# Patient Record
Sex: FEMALE | Race: BLACK OR AFRICAN AMERICAN | NOT HISPANIC OR LATINO | Employment: FULL TIME | URBAN - METROPOLITAN AREA
[De-identification: names, ages, dates, MRNs, and addresses within clinical notes are randomized per-mention and may not be internally consistent; named-entity substitution may affect disease eponyms.]

---

## 2018-01-04 PROBLEM — Z14.1 CYSTIC FIBROSIS CARRIER: Status: ACTIVE | Noted: 2018-01-04

## 2020-06-10 DIAGNOSIS — R20.2 PARESTHESIA OF SKIN: Primary | ICD-10-CM

## 2020-06-12 ENCOUNTER — HOSPITAL ENCOUNTER (OUTPATIENT)
Dept: RADIOLOGY | Facility: HOSPITAL | Age: 30
Discharge: HOME | End: 2020-06-12
Attending: INTERNAL MEDICINE
Payer: MEDICAID

## 2020-06-12 DIAGNOSIS — R20.2 PARESTHESIA OF SKIN: ICD-10-CM

## 2020-06-12 PROCEDURE — 72100 X-RAY EXAM L-S SPINE 2/3 VWS: CPT | Mod: 26,,, | Performed by: RADIOLOGY

## 2020-06-12 PROCEDURE — 72100 XR LUMBAR SPINE 2 OR 3 VIEWS: ICD-10-PCS | Mod: 26,,, | Performed by: RADIOLOGY

## 2020-06-12 PROCEDURE — 72100 X-RAY EXAM L-S SPINE 2/3 VWS: CPT | Mod: TC,FY

## 2023-05-09 DIAGNOSIS — M25.561 RIGHT KNEE PAIN: Primary | ICD-10-CM

## 2023-07-14 ENCOUNTER — CLINICAL SUPPORT (OUTPATIENT)
Dept: REHABILITATION | Facility: HOSPITAL | Age: 33
End: 2023-07-14
Payer: MEDICAID

## 2023-07-14 DIAGNOSIS — M25.661 DECREASED RANGE OF MOTION OF RIGHT KNEE: ICD-10-CM

## 2023-07-14 DIAGNOSIS — M62.81 QUADRICEPS WEAKNESS: ICD-10-CM

## 2023-07-14 PROCEDURE — 97110 THERAPEUTIC EXERCISES: CPT | Mod: PN

## 2023-07-14 PROCEDURE — 97161 PT EVAL LOW COMPLEX 20 MIN: CPT | Mod: PN

## 2023-07-14 NOTE — PROGRESS NOTES
[facility]  Physical Therapy Initial Evaluation    Name: Althea Lopez  Clinic Number: [id number]    Therapy Diagnosis:   Encounter Diagnoses   Name Primary?    Decreased range of motion of right knee     Quadriceps weakness      Physician: Order, Paper    Physician Orders: PT Eval and Treat   Medical Diagnosis from Referral:   M25.561 (ICD-10-CM) - Right knee pain    Evaluation Date: 7/14/2023  Plan of Care Expiration: 10/14/23    Authorization Period Expiration: 9/1/23  Visit # / Visits authorized: 1/ 1      Time In: 0815  Time Out: 0910    Total Billable Time: 55 minutes    Precautions: Standard    Subjective   Date of onset: 6 months    History of current condition:   Althea  is a 32 year old female presenting with c/o bilateral knee pain, right > left.  She reports an insidious onset 6 months ago that is worsening.  She denies any recent injections.  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  She states he pain is worse with cold breeze.   The patient denies a history of falls or use of an assistive device.  Her goal is for the knees to get better. She would like to improve the functional mobility of the knees. She would like to get on floor and play with one year old child.      Medical History:   No past medical history on file.    Surgical History:   Althea Lopez  has a past surgical history that includes Dilation and curettage of uterus.    Medications:   Althea has a current medication list which includes the following prescription(s): prenatal vit103-iron fum-folic.    Allergies:   Review of patient's allergies indicates:  No Known Allergies     Imaging: none on record.     Prior Therapy: none  Social History:  unknown, [facility]  Occupation: CNA  Prior Level of Function: independent  Current Level of Function: independent    Pain:  Current 6/10, worst 10/10, best 6/10   Location: Bilateral knees, right > left    Aggravating Factors: bending knee,  kneeling, squatting, stairs, twisting  Easing Factors: medicine     Pts goals: Her goal is for the knees to get better. She would like to improve the functional mobility of the knees. She would like to get on floor and play with one year old child.      Objective     Gait: pt presents ambulating with an antalgic gait. Decreased stance on right LE.   Observation: mild to moderate quad atrophy  Palpation: mild tenderness medial and lateral joint line.   Sensation: Intact    Range of Motion/Strength:   .  Knee  Right   Left      AROM  MMT  AROM  MMT    Flexion  100 4- 105 4-   Extension  -2 4- -2 4-       Bed Mobility:Independent   Transfers: Independent         TREATMENT     Treatment Time In: 0845  Treatment Time Out: 0915    Total Treatment time separate from Evaluation: 30 minutes    Althea received therapeutic exercises to develop strength, endurance, ROM, flexibility, posture and core stabilization for 20 minutes including:     quad set 2 x 10  SLR 2 x 10  Ball squeeze 2 x 10  Heel slide x 10  Towel stretch 30 sec x 3 ea.     Althea received the following manual therapy techniques:  were applied to the: bilateral knees for 10 minutes, including:  Patellar mobilization, bilateral tibiofemoral distraction grade 2    Education provided:   - HEP compliance    Written Home Exercises Provided: yes.    Exercises were reviewed and Althea was able to demonstrate them prior to the end of the session.  Althea demonstrated good  understanding of the education provided.     See EMR under Patient Instructions for exercises provided 7/14/2023.    Assessment     Pt presents with signs and symptoms consistent with referring diagnosis. Evaluation has determined a decrease in functional status and subjective and objective deficits that can be addressed by physical therapy intervention. Pt demonstrates pain limiting functional activities. Decreased flexibility and strength limiting normal movement patterns. Decreased segmental  motion. Decreased postural strength and awareness. Positive special testing. Decreased participation in functional and recreational activities. Subjective and objective measures are addressed by goals in the plan of care.  Patient/family are involved in the development of these goals. Patient/family are educated about current injury and treatment.       Plan of care was dicussed with patient. Pt will benefit from skilled outpatient Physical Therapy to address the deficits stated above and in the chart below, provide pt/family education, and to maximize pt's level of independence. Pt's spiritual, cultural and educational needs considered and patient is agreeable to the plan of care and goals as stated below:     Pt prognosis is Good.  Anticipated Barriers for therapy: none    Medical Necessity is demonstrated by the following  History  Co-morbidities and personal factors that may impact the plan of care Co-morbidities:   none    Personal Factors:   no deficits     low   Examination  Body Structures and Functions, activity limitations and participation restrictions that may impact the plan of care Body Regions:   lower extremities    Body Systems:    gross symmetry  ROM  strength  gait  transitions    Participation Restrictions:   Work, housework,     Activity limitations:   Learning and applying knowledge  no deficits    General Tasks and Commands  no deficits    Communication  no deficits    Mobility  lifting and carrying objects  walking    Self care  dressing    Domestic Life  shopping  cooking  doing house work (cleaning house, washing dishes, laundry)    Interactions/Relationships  no deficits    Life Areas  no deficits    Community and Social Life  community life  recreation and leisure         low   Clinical Presentation stable and uncomplicated low   Decision Making/ Complexity Score: low     Goals:    Short Term Goals (4 Weeks):     1.Pt to increase strength by a 1/2 grade of muscles test to allow for  improvement in functional activities such as performing chores.  2.Pt to improve range of motion by 25% to allow for improved functional mobility to allow for improvement in IADLs.   3.Pt to report compliance with HEP and demonstrate proper exercise technique to PT to show competence with self management of condition.  4.Decrease pain by 25% during functional activities.    Long Term Goals (12 Weeks):     1. Increase ROM to allow improved joint biomechanics during functional activities.   2.Increase trunk and lower extremity strength to within normal limits during functional activities.   3. Independent with home exercise program.   4. Full return to functional activities with manageable complaints.  5. Patient to demonstrate improved posture and body mechanics.  6. Decrease pain by 75% during functional activities.    Plan     Plan of care Certification: 7/14/2023 to 10/14/23.    Recommended Treatment Plan: 2 times per week for 12 weeks with treatments to consist of:  Neuromuscular and postural re-education,  training, therapeutic exercise, therapeutic activities,balance training, gait training, manual therapy, soft tissue mobilization, ROM exercises, Cardiovascular,  Postural stabilization, manual traction, spinal mobilization, moist heat, cryotherapy, electrical stimulation, ultrasound, home exercise education and planning.    Elliot Messer, PT

## 2023-07-18 ENCOUNTER — CLINICAL SUPPORT (OUTPATIENT)
Dept: REHABILITATION | Facility: HOSPITAL | Age: 33
End: 2023-07-18
Payer: MEDICAID

## 2023-07-18 DIAGNOSIS — M62.81 QUADRICEPS WEAKNESS: ICD-10-CM

## 2023-07-18 DIAGNOSIS — M25.661 DECREASED RANGE OF MOTION OF RIGHT KNEE: Primary | ICD-10-CM

## 2023-07-18 PROCEDURE — 97110 THERAPEUTIC EXERCISES: CPT | Mod: PN,CQ

## 2023-07-18 NOTE — PROGRESS NOTES
"  Physical Therapy Daily Treatment Note     Name: Althea Lopez  Clinic Number: [id number]    Therapy Diagnosis:   Encounter Diagnoses   Name Primary?    Decreased range of motion of right knee Yes    Quadriceps weakness      Physician: Order, Paper    Visit Date: 7/18/2023    Physician Orders: PT Eval and Treat   Medical Diagnosis from Referral:   M25.561 (ICD-10-CM) - Right knee pain     Evaluation Date: 7/14/2023  Plan of Care Expiration: 10/14/23     Authorization Period Expiration: 9/1/23  Visit # / Visits authorized: 1/ 12  PN DUE: 08/14/2023     Time In: 0930AM  Time Out: 1030AM     Total Billable Time: 60 minutes     Precautions: Standard    Precautions: Standard    Subjective     Pt reports: she's been doing the exercises at home, and its not as sore and painful as before.  She was compliant with home exercise program.  Response to previous treatment: initial eval  Functional change: ongoing    Pain: 5/10  Location: Bilateral knees, right > left    Objective     Althea received therapeutic exercises to develop strength, endurance, ROM, flexibility, and posture for 60 minutes including:    Upright Bike 10 min  Shuttle Squats 1 black band 3x10  Lateral Step Ups 2x10 R only  Quad set 2 x 10, 3"/ea  SLR 2 x 10/ea  Ball squeeze 2 x 10, 3"  Heel slide 2 min/ea  Towel stretch 30 sec x 3 ea.-NP    Althea received the following manual therapy techniques: Joint mobilizations were applied to the: 00 for 00 minutes, including:  Patellar mobilization, bilateral tibiofemoral distraction grade 2      Althea received cold pack for 10 minutes to both knees.      Home Exercises Provided and Patient Education Provided     Education provided:   Cont to perform HEP as provided.     Written Home Exercises Provided: Patient instructed to cont prior HEP.  Exercises were reviewed and Althea was able to demonstrate them prior to the end of the session.  Althea demonstrated good  understanding of the education provided.     See EMR " under Patient Instructions for exercises provided prior visit.    Assessment     Althea tolerated the above tx session fair to well with minimal c/o pain. Initiated quad and glute strengthening in addition to reiterating HEP, and pt performed well with an appropriate level of muscle fatigue achieved throughout. Pt encouraged to continue HEP between sessions.    Althea Is progressing well towards her goals.   Pt prognosis is Good.     Pt will continue to benefit from skilled outpatient physical therapy to address the deficits listed in the problem list box on initial evaluation, provide pt/family education and to maximize pt's level of independence in the home and community environment.     Pt's spiritual, cultural and educational needs considered and pt agreeable to plan of care and goals.    Anticipated barriers to physical therapy: none    Goals: Short Term Goals (4 Weeks):      1.Pt to increase strength by a 1/2 grade of muscles test to allow for improvement in functional activities such as performing chores.  2.Pt to improve range of motion by 25% to allow for improved functional mobility to allow for improvement in IADLs.   3.Pt to report compliance with HEP and demonstrate proper exercise technique to PT to show competence with self management of condition.  4.Decrease pain by 25% during functional activities.     Long Term Goals (12 Weeks):      1. Increase ROM to allow improved joint biomechanics during functional activities.   2.Increase trunk and lower extremity strength to within normal limits during functional activities.   3. Independent with home exercise program.   4. Full return to functional activities with manageable complaints.  5. Patient to demonstrate improved posture and body mechanics.  6. Decrease pain by 75% during functional activities.    Plan     Certification date:     Cont skilled PT session towards PT and patient's goals.    Chelsi Romero, PTA   07/18/2023

## 2023-07-20 ENCOUNTER — CLINICAL SUPPORT (OUTPATIENT)
Dept: REHABILITATION | Facility: HOSPITAL | Age: 33
End: 2023-07-20
Payer: MEDICAID

## 2023-07-20 DIAGNOSIS — M25.661 DECREASED RANGE OF MOTION OF RIGHT KNEE: Primary | ICD-10-CM

## 2023-07-20 DIAGNOSIS — M62.81 QUADRICEPS WEAKNESS: ICD-10-CM

## 2023-07-20 PROCEDURE — 97110 THERAPEUTIC EXERCISES: CPT | Mod: PN

## 2023-07-20 NOTE — PROGRESS NOTES
"  Physical Therapy Daily Treatment Note     Name: Althea Lopez  Clinic Number: [id number]    Therapy Diagnosis:   Encounter Diagnoses   Name Primary?    Decreased range of motion of right knee Yes    Quadriceps weakness      Physician: Order, Paper    Visit Date: 7/20/2023    Physician Orders: PT Eval and Treat   Medical Diagnosis from Referral:   M25.561 (ICD-10-CM) - Right knee pain     Evaluation Date: 7/14/2023  Plan of Care Expiration: 10/14/23     Authorization Period Expiration: 9/1/23  Visit # / Visits authorized: 2/12 (+Evaluation)  PN DUE: 08/14/2023     Time In: 9:30 AM  Time Out: 10:40 AM     Total Billable Time: 60 minutes    Precautions: Standard    Subjective     Pt reports: Soreness from last session but overall feels better. Did a lot of driving yesterday that caused some more soreness. Continues to have more R knee pain than L knee pain.   She was compliant with home exercise program.  Response to previous treatment: No adverse reactions.  Functional change: Ongoing    Pain: 5/10  Location: Bilateral knees, right > left    Objective     Bold = performed    Althea received therapeutic exercises to develop strength, endurance, ROM, flexibility, and posture for 60 minutes including:    Upright Bike: 10 minutes  Quad sets: 2 x 10, 3"/ea  SLR 2 x 10/ea  Glute Bridge + Adductor Ball squeeze: 3 x 10, 3"  +LAQ's: 2x10, 3" holds, 3# ankle weights  +Lateral Monster Walks: 25'x2 laps, RTB at knees  Shuttle Squats 1 black band 3x10  Lateral Step Ups 2x10 R only  Heel slide 2 min/ea  Towel stretch 30 sec x 3 ea.-NP    Althea received the following manual therapy techniques: Joint mobilizations were applied to the: 00 for 00 minutes, including:  Patellar mobilization, bilateral tibiofemoral distraction grade 2      Althea received a hot pack for 10 minutes to both knees at the end of the session.      Home Exercises Provided and Patient Education Provided     Education provided:   Cont to perform HEP as provided. "     Written Home Exercises Provided: Patient instructed to cont prior HEP.  Exercises were reviewed and Althea was able to demonstrate them prior to the end of the session.  Althea demonstrated good  understanding of the education provided.     See EMR under Patient Instructions for exercises provided prior visit.    Assessment     Althea presented to therapy with reports of increased soreness in bilateral knees, R>L. Tolerated the above session well with continued focus on improving quadriceps, hamstring, and gluteal strength. Requires increased rest breaks between exercises due to fatigue and dizziness. Progressed to glue bridges and lateral monster walks with fair tolerance. Overall continues to benefit from participating in skilled physical therapy in order to progress towards functional goals.     Althea Is progressing well towards her goals.   Pt prognosis is Good.     Pt will continue to benefit from skilled outpatient physical therapy to address the deficits listed in the problem list box on initial evaluation, provide pt/family education and to maximize pt's level of independence in the home and community environment.     Pt's spiritual, cultural and educational needs considered and pt agreeable to plan of care and goals.    Anticipated barriers to physical therapy: none    Goals: Short Term Goals (4 Weeks):      1.Pt to increase strength by a 1/2 grade of muscles test to allow for improvement in functional activities such as performing chores.  2.Pt to improve range of motion by 25% to allow for improved functional mobility to allow for improvement in IADLs.   3.Pt to report compliance with HEP and demonstrate proper exercise technique to PT to show competence with self management of condition.  4.Decrease pain by 25% during functional activities.     Long Term Goals (12 Weeks):      1. Increase ROM to allow improved joint biomechanics during functional activities.   2.Increase trunk and lower extremity  strength to within normal limits during functional activities.   3. Independent with home exercise program.   4. Full return to functional activities with manageable complaints.  5. Patient to demonstrate improved posture and body mechanics.  6. Decrease pain by 75% during functional activities.    Plan     Certification date:     Cont skilled PT session towards PT and patient's goals.    Claudia Love, PT   07/20/2023

## 2023-07-26 ENCOUNTER — CLINICAL SUPPORT (OUTPATIENT)
Dept: REHABILITATION | Facility: HOSPITAL | Age: 33
End: 2023-07-26
Payer: MEDICAID

## 2023-07-26 DIAGNOSIS — M62.81 QUADRICEPS WEAKNESS: ICD-10-CM

## 2023-07-26 DIAGNOSIS — M25.661 DECREASED RANGE OF MOTION OF RIGHT KNEE: Primary | ICD-10-CM

## 2023-07-26 PROCEDURE — 97110 THERAPEUTIC EXERCISES: CPT | Mod: PN,CQ

## 2023-07-26 NOTE — PROGRESS NOTES
"  Physical Therapy Daily Treatment Note     Name: Althea Lopez  Clinic Number: [id number]    Therapy Diagnosis:   Encounter Diagnoses   Name Primary?    Decreased range of motion of right knee Yes    Quadriceps weakness      Physician: Order, Paper    Visit Date: 7/26/2023    Physician Orders: PT Eval and Treat   Medical Diagnosis from Referral:   M25.561 (ICD-10-CM) - Right knee pain     Evaluation Date: 7/14/2023  Plan of Care Expiration: 10/14/23     Authorization Period Expiration: 9/1/23  Visit # / Visits authorized: 3/12 (+Evaluation)  PN DUE: 08/14/2023     Time In: 0821AM  Time Out: 0915AM     Total Billable Time: 50 minutes    Precautions: Standard    Subjective     Pt reports: her left knee     She was compliant with home exercise program.  Response to previous treatment: No adverse reactions.  Functional change: Ongoing    Pain: 5/10  Location: Bilateral knees, right > left    Objective     Bold = performed    Althea received therapeutic exercises to develop strength, endurance, ROM, flexibility, and posture for 60 minutes including:    Upright Bike: 10 minutes  Quad sets: 2 x 10, 3"/ea  SLR 2 x 10/ea  Glute Bridge + Adductor Ball squeeze: 3 x 10, 3"  LAQ's: 2x10, 3" holds, 3# ankle weights  Lateral Monster Walks: 40'x6 laps, RTB at knees  Shuttle Squats 1.5 black band 3x10      Resume NV, if applicable:  Lateral Step Ups 2x10 R only  Heel slide 2 min/ea  Towel stretch 30 sec x 3 ea.    Althea received a cold pack for 10 minutes to both knees at the end of the session.      Home Exercises Provided and Patient Education Provided     Education provided:   Cont to perform HEP as provided.     Written Home Exercises Provided: Patient instructed to cont prior HEP.  Exercises were reviewed and Althea was able to demonstrate them prior to the end of the session.  Althea demonstrated good  understanding of the education provided.     See EMR under Patient Instructions for exercises provided prior " visit.    Assessment     Althea continues to reports with pain and stiffness in bilateral knees, R>L. Continues to tolerate the above session well with continued focus on improving quadriceps, hamstring, and gluteal strength. Pt also requires increased rest breaks between exercises due to fatigue. Overall continues to benefit from participating in skilled physical therapy in order to progress towards decreased pain and functional goals.    Althea Is progressing well towards her goals.   Pt prognosis is Good.     Pt will continue to benefit from skilled outpatient physical therapy to address the deficits listed in the problem list box on initial evaluation, provide pt/family education and to maximize pt's level of independence in the home and community environment.     Pt's spiritual, cultural and educational needs considered and pt agreeable to plan of care and goals.    Anticipated barriers to physical therapy: none    Goals: Short Term Goals (4 Weeks):      1.Pt to increase strength by a 1/2 grade of muscles test to allow for improvement in functional activities such as performing chores.  2.Pt to improve range of motion by 25% to allow for improved functional mobility to allow for improvement in IADLs.   3.Pt to report compliance with HEP and demonstrate proper exercise technique to PT to show competence with self management of condition.  4.Decrease pain by 25% during functional activities.     Long Term Goals (12 Weeks):      1. Increase ROM to allow improved joint biomechanics during functional activities.   2.Increase trunk and lower extremity strength to within normal limits during functional activities.   3. Independent with home exercise program.   4. Full return to functional activities with manageable complaints.  5. Patient to demonstrate improved posture and body mechanics.  6. Decrease pain by 75% during functional activities.    Plan     Certification date:     Cont skilled PT session towards PT and  patient's goals.    Chelsi Romero, PTA   07/26/2023

## 2023-07-28 ENCOUNTER — CLINICAL SUPPORT (OUTPATIENT)
Dept: REHABILITATION | Facility: HOSPITAL | Age: 33
End: 2023-07-28
Payer: MEDICAID

## 2023-07-28 DIAGNOSIS — M62.81 QUADRICEPS WEAKNESS: ICD-10-CM

## 2023-07-28 DIAGNOSIS — M25.661 DECREASED RANGE OF MOTION OF RIGHT KNEE: Primary | ICD-10-CM

## 2023-07-28 PROCEDURE — 97110 THERAPEUTIC EXERCISES: CPT | Mod: PN,CQ

## 2023-07-28 NOTE — PROGRESS NOTES
"  Physical Therapy Daily Treatment Note     Name: Althea Lopez  Clinic Number: [id number]    Therapy Diagnosis:   Encounter Diagnoses   Name Primary?    Decreased range of motion of right knee Yes    Quadriceps weakness      Physician: Order, Paper    Visit Date: 7/28/2023    Physician Orders: PT Eval and Treat   Medical Diagnosis from Referral:   M25.561 (ICD-10-CM) - Right knee pain     Evaluation Date: 7/14/2023  Plan of Care Expiration: 10/14/23     Authorization Period Expiration: 9/1/23  Visit # / Visits authorized: 4/12 (+Evaluation)  PN DUE: 08/14/2023     Time In: 1048AM  Time Out: 1148AM     Total Billable Time: 30 minutes    Precautions: Standard    Subjective     Pt reports: both knees continue to bother her.    She was compliant with home exercise program.  Response to previous treatment: No adverse reactions.  Functional change: Ongoing    Pain: 5/10  Location: Bilateral knees, right > left    Objective     Bold = performed    Althea received therapeutic exercises to develop strength, endurance, ROM, flexibility, and posture for 60 minutes including:    Upright Bike: 10 minutes  Quad sets: 2 x 10, 3"/ea  SLR 2 x 10/ea  Glute Bridge + Adductor Ball squeeze: 3 x 10, 3"  LAQ's: 2x10, 3" holds, 3# ankle weights  Shuttle Squats 1.5 black band 3x10  Lateral Step Ups 2x10 Both    Resume NV, if applicable:    Heel slide 2 min/ea  Towel stretch 30 sec x 3 ea.  Lateral Monster Walks: 40'x6 laps, RTB at knees    Althea received a cold pack for 10 minutes to both knees at the end of the session.      Home Exercises Provided and Patient Education Provided     Education provided:   Cont to perform HEP as provided.     Written Home Exercises Provided: Patient instructed to cont prior HEP.  Exercises were reviewed and Althea was able to demonstrate them prior to the end of the session.  Althea demonstrated good  understanding of the education provided.     See EMR under Patient Instructions for exercises provided prior " visit.    Assessment     Althea continues to reports with pain and stiffness in bilateral knees, R>L. Continues to tolerate the above session well with continued focus on improving quadriceps, hamstring, and gluteal strength. Pt also requires increased rest breaks between exercises due to fatigue. Overall continues to benefit from participating in skilled physical therapy in order to progress towards decreased pain and functional goals.Pt encouraged to continue HEP between sessions for optimal therapeutic gains.    Althea Is progressing well towards her goals.   Pt prognosis is Good.     Pt will continue to benefit from skilled outpatient physical therapy to address the deficits listed in the problem list box on initial evaluation, provide pt/family education and to maximize pt's level of independence in the home and community environment.     Pt's spiritual, cultural and educational needs considered and pt agreeable to plan of care and goals.    Anticipated barriers to physical therapy: none    Goals: Short Term Goals (4 Weeks):      1.Pt to increase strength by a 1/2 grade of muscles test to allow for improvement in functional activities such as performing chores.  2.Pt to improve range of motion by 25% to allow for improved functional mobility to allow for improvement in IADLs.   3.Pt to report compliance with HEP and demonstrate proper exercise technique to PT to show competence with self management of condition.  4.Decrease pain by 25% during functional activities.     Long Term Goals (12 Weeks):      1. Increase ROM to allow improved joint biomechanics during functional activities.   2.Increase trunk and lower extremity strength to within normal limits during functional activities.   3. Independent with home exercise program.   4. Full return to functional activities with manageable complaints.  5. Patient to demonstrate improved posture and body mechanics.  6. Decrease pain by 75% during functional  activities.    Plan     Certification date:     Cont skilled PT session towards PT and patient's goals.    Chelsi Romero, PTA   07/28/2023

## 2023-08-15 ENCOUNTER — CLINICAL SUPPORT (OUTPATIENT)
Dept: REHABILITATION | Facility: HOSPITAL | Age: 33
End: 2023-08-15
Payer: MEDICAID

## 2023-08-15 DIAGNOSIS — M25.661 DECREASED RANGE OF MOTION OF RIGHT KNEE: Primary | ICD-10-CM

## 2023-08-15 DIAGNOSIS — M62.81 QUADRICEPS WEAKNESS: ICD-10-CM

## 2023-08-15 PROCEDURE — 97110 THERAPEUTIC EXERCISES: CPT | Mod: PN

## 2023-08-15 NOTE — PROGRESS NOTES
"  Physical Therapy Progress Note     Name: Althea Lopez  Clinic Number: [id number]    Therapy Diagnosis:   Encounter Diagnoses   Name Primary?    Decreased range of motion of right knee Yes    Quadriceps weakness      Physician: Order, Paper    Visit Date: 8/15/2023    Physician Orders: PT Eval and Treat   Medical Diagnosis from Referral:   M25.561 (ICD-10-CM) - Right knee pain     Evaluation Date: 7/14/2023  Plan of Care Expiration: 10/14/23     Authorization Period Expiration: 9/1/23  Visit # / Visits authorized: 5/12 (+Evaluation)    PN DUE: 09/15/2023     Time In: 1102  Time Out: 1200     Total Billable Time: 55 minutes    Precautions: Standard    Subjective     Pt reports: both knees are doing a little better with therapy.     She was compliant with home exercise program.  Response to previous treatment: fair  Functional change:  none    Pain: 4/10  Location: Bilateral knees, right > left    Objective     Range of Motion/Strength:   .  Knee  Right    Left        AROM  MMT  AROM  MMT    Flexion  115 4 115 4   Extension  -1 4 -1 4         Althea received therapeutic exercises to develop strength, endurance, ROM, flexibility, and posture for 45 minutes including:    Upright Bike: 10 minutes  Quad sets: 2 x 10, 3"/ea  SLR 2 x 10/ea  Glute Bridge + Adductor Ball squeeze: 3 x 10, 3"; np  LAQ's: 2x10, 3" holds, 4# ankle weights  Shuttle Squats 1.5 black band 3x10  Lateral Step Ups 2x10 Both:Np  Matrix hip abd 3 x 10 35lbs  Heel slide 2 min/ea  Towel stretch 30 sec x 3 ea.  Lateral Monster Walks: 40'x6 laps, RTB at knees: np    Manual tibiofemoral distraction, patellar glides x 10 min    Althea received a cold pack for 10 minutes to both knees at the end of the session.    Home Exercises Provided and Patient Education Provided     Education provided:   Cont to perform HEP as provided.     Written Home Exercises Provided: Patient instructed to cont prior HEP.  Exercises were reviewed and Althea was able to demonstrate " them prior to the end of the session.  Althea demonstrated good  understanding of the education provided.     See EMR under Patient Instructions for exercises provided prior visit.    Assessment     Pt ambulates  with a slight antalgic gait, decreased stance on right LE.  Progressed quad strengthening emphasis with good performance. No c/o increased discomfort with prescribed activities.  Good response to exercise progression. Improved bilateral functional knee flexion.   Althea Is progressing well towards her goals.   Pt prognosis is Good.     Pt will continue to benefit from skilled outpatient physical therapy to address the deficits listed in the problem list box on initial evaluation, provide pt/family education and to maximize pt's level of independence in the home and community environment.     Pt's spiritual, cultural and educational needs considered and pt agreeable to plan of care and goals.    Anticipated barriers to physical therapy: none    Goals: Short Term Goals (4 Weeks):  Updated 8/15/23,  partially MET, in progress     1.Pt to increase strength by a 1/2 grade of muscles test to allow for improvement in functional activities such as performing chores.  2.Pt to improve range of motion by 25% to allow for improved functional mobility to allow for improvement in IADLs.  MET  3.Pt to report compliance with HEP and demonstrate proper exercise technique to PT to show competence with self management of condition.  4.Decrease pain by 25% during functional activities.  Not MET     Long Term Goals (12 Weeks):      1. Increase ROM to allow improved joint biomechanics during functional activities.   2.Increase trunk and lower extremity strength to within normal limits during functional activities.   3. Independent with home exercise program.   4. Full return to functional activities with manageable complaints.  5. Patient to demonstrate improved posture and body mechanics.  6. Decrease pain by 75% during  functional activities.    Plan     Certification date:     Cont skilled PT session towards PT and patient's goals.    Elliot Messer, PT   08/15/2023

## 2023-08-29 ENCOUNTER — CLINICAL SUPPORT (OUTPATIENT)
Dept: REHABILITATION | Facility: HOSPITAL | Age: 33
End: 2023-08-29
Payer: MEDICAID

## 2023-08-29 DIAGNOSIS — M62.81 QUADRICEPS WEAKNESS: ICD-10-CM

## 2023-08-29 DIAGNOSIS — M25.661 DECREASED RANGE OF MOTION OF RIGHT KNEE: Primary | ICD-10-CM

## 2023-08-29 PROCEDURE — 97110 THERAPEUTIC EXERCISES: CPT | Mod: PN,CQ

## 2023-08-29 NOTE — PROGRESS NOTES
Physical Therapy Progress Note     Name: Althea Lopez  Clinic Number: [id number]    Therapy Diagnosis:   Encounter Diagnoses   Name Primary?    Decreased range of motion of right knee Yes    Quadriceps weakness      Physician: Order, Paper    Visit Date: 8/29/2023    Physician Orders: PT Eval and Treat   Medical Diagnosis from Referral:   M25.561 (ICD-10-CM) - Right knee pain     Evaluation Date: 7/14/2023  Plan of Care Expiration: 10/14/23     Authorization Period Expiration: 9/1/23  Visit # / Visits authorized: 6/12 (+Evaluation)    PN DUE: 09/15/2023     Time In: 0938AM  Time Out: 1018AM     Total Billable Time: 35 minutes    Precautions: Standard    Subjective     Pt reports: both knees are doing a much better as she does not have anymore pain at this time. She would like today to be her last visit.    She was compliant with home exercise program.  Response to previous treatment: fair  Functional change:  none    Pain: 0/10  Location: Bilateral knees, right > left    Objective     Althea received therapeutic exercises to develop strength, endurance, ROM, flexibility, and posture for 35 minutes including:    Upright Bike: 10 minutes lvl 5.0  Shuttle Squats 2.0 black band 3x10  Matrix Hip Abd 3 x 10 35lbs  Matrix HS Curls 35lb 3x10  Matrix Knee Ext 15lb 3x10    Home Exercises Provided and Patient Education Provided     Education provided:   Cont to perform HEP as provided.     Written Home Exercises Provided: Patient instructed to cont prior HEP.  Exercises were reviewed and Althea was able to demonstrate them prior to the end of the session.  Althea demonstrated good  understanding of the education provided.     See EMR under Patient Instructions for exercises provided prior visit.    Assessment   Althea tolerated the above tx session well without provocation of pain. Today's session focused around introducing peripheral machines to pt as she was instructed to join a local gym to continue recovery. Pt gave  verbal understanding with no adverse effects post tx.     Althea Is progressing well towards her goals.   Pt prognosis is Good.     Pt will continue to benefit from skilled outpatient physical therapy to address the deficits listed in the problem list box on initial evaluation, provide pt/family education and to maximize pt's level of independence in the home and community environment.     Pt's spiritual, cultural and educational needs considered and pt agreeable to plan of care and goals.    Anticipated barriers to physical therapy: none    Goals: Short Term Goals (4 Weeks):  Updated 8/15/23,  partially MET, in progress     1.Pt to increase strength by a 1/2 grade of muscles test to allow for improvement in functional activities such as performing chores.  2.Pt to improve range of motion by 25% to allow for improved functional mobility to allow for improvement in IADLs.  MET  3.Pt to report compliance with HEP and demonstrate proper exercise technique to PT to show competence with self management of condition.  4.Decrease pain by 25% during functional activities.  Not MET     Long Term Goals (12 Weeks):      1. Increase ROM to allow improved joint biomechanics during functional activities.   2.Increase trunk and lower extremity strength to within normal limits during functional activities.   3. Independent with home exercise program.   4. Full return to functional activities with manageable complaints.  5. Patient to demonstrate improved posture and body mechanics.  6. Decrease pain by 75% during functional activities.    Plan     Pt self-discharged. PT to close referral at this time.     Chelsi Romero, PTA   08/29/2023